# Patient Record
(demographics unavailable — no encounter records)

---

## 2024-10-28 NOTE — PLAN
[FreeTextEntry1] : COLONOSCOPY - UPCOMING APPOINTMENT SLOW TAPER LEXAPRO TO 10 MG DAILY AND FOLLOW-UP

## 2024-10-28 NOTE — HISTORY OF PRESENT ILLNESS
[de-identified] : STILL ON LIPTIOR EATING HEALTHY.   WALKING 3 MILES A DAY.   ON LEXAPRO FOR ANXIETY.  ON FOR 3 - 4 YEARS.  NO PANIC ATTACKS.  NO DEPRESSED.

## 2024-11-22 NOTE — ASSESSMENT
[FreeTextEntry1] : A/P Screening colon  I discussed the risks and benefits of colonoscopy and patient was given opportunity to ask questions. Colonoscopy to r/o colon cancer, polyps, AVM's. Patient is medically optimized for the procedure

## 2024-11-22 NOTE — HISTORY OF PRESENT ILLNESS
[FreeTextEntry1] : Patient with a history of high cholesterol, anxiety.  Here for screening  Patient has no constipation diarrhea black stools bloody stools or abdominal pain.  No unintentional weight loss.  No GERD symptoms.  No family history of colon cancer colon polyps.  He has never had a colonoscopy before

## 2024-11-23 NOTE — HISTORY OF PRESENT ILLNESS
[FreeTextEntry1] : f/u lab work [de-identified] : MR. ZAFAR IS A PLEASANT 50 YO PRESENTING FOR FOLLOW-UP TO REVIEW LAB WORK.  IS NOT TAKING VITAMIN D SUPPLEMENTS.  IS ON LIPITOR 10 MG FOR AWHILE.  MISSED ABOUT ONE WEEK OF MEDICATION.  DIET IS ALRIGHT "NOT GREAT".  PORTIONS COULD IMPROVE.  EATING OUT "QUIT A BIT".  COULD MAKE BETTER CHOICES.  WALKING IN THE MORNING.  COULD CUT DOWN ON CARBOHYDRATES.  HAS NOT SEEN A NUTRITIONIST.  WAS MORE ANXIOUS ON LEXAPRO 10 MG DAILY WHEN TAPERED.  WOULD LIKE TO GO BACK TO 20 MG.  FELT BETTER AND MORE "LEVEL" ON THAT DOSAGE.  OTHERWISE FEELING WELL TODAY.

## 2024-11-23 NOTE — PLAN
[FreeTextEntry1] : LAB WORK REVIEWED FROM 11/4/24: VITAMIN D 24.0, A1C 5.9, CHOLESTEROL 257, , HDL 39, TRIG 362 ADVISED TO START VITAMIN D 2000 DAILY WILL INCREASE LIPITOR TO 20 MG QHS RECHECK LAB WORK POST INCREASED COUNSELLED ON HEALTHY DIET AND LIFESTYLE MODIFICATIONS HEALTHY WEIGHT LOSS  NUTRITIONIST REFERRAL TO KEEP FOOD JOURNAL PRIOR WILL GO BACK TO LEXAPRO 20 MG DAILY STRESS REDUCTION EXERCISES CALL WITH ANY QUESTIONS, CONCERNS OR CHANGES

## 2024-11-23 NOTE — HISTORY OF PRESENT ILLNESS
[FreeTextEntry1] : f/u lab work [de-identified] : MR. ZAFAR IS A PLEASANT 48 YO PRESENTING FOR FOLLOW-UP TO REVIEW LAB WORK.  IS NOT TAKING VITAMIN D SUPPLEMENTS.  IS ON LIPITOR 10 MG FOR AWHILE.  MISSED ABOUT ONE WEEK OF MEDICATION.  DIET IS ALRIGHT "NOT GREAT".  PORTIONS COULD IMPROVE.  EATING OUT "QUIT A BIT".  COULD MAKE BETTER CHOICES.  WALKING IN THE MORNING.  COULD CUT DOWN ON CARBOHYDRATES.  HAS NOT SEEN A NUTRITIONIST.  WAS MORE ANXIOUS ON LEXAPRO 10 MG DAILY WHEN TAPERED.  WOULD LIKE TO GO BACK TO 20 MG.  FELT BETTER AND MORE "LEVEL" ON THAT DOSAGE.  OTHERWISE FEELING WELL TODAY.

## 2025-03-15 NOTE — HEALTH RISK ASSESSMENT
[Good] : ~his/her~  mood as  good [Yes] : Yes [Monthly or less (1 pt)] : Monthly or less (1 point) [1 or 2 (0 pts)] : 1 or 2 (0 points) [Never (0 pts)] : Never (0 points) [No] : In the past 12 months have you used drugs other than those required for medical reasons? No [Little interest or pleasure doing things] : 1) Little interest or pleasure doing things [Feeling down, depressed, or hopeless] : 2) Feeling down, depressed, or hopeless [0] : 2) Feeling down, depressed, or hopeless: Not at all (0) [PHQ-2 Negative - No further assessment needed] : PHQ-2 Negative - No further assessment needed [Former] : Former [5-9] : 5-9 [> 15 Years] : > 15 Years [NO] : No [HIV test declined] : HIV test declined [Hepatitis C test declined] : Hepatitis C test declined [None] : None [With Family] : lives with family [# of Members in Household ___] :  household currently consist of [unfilled] member(s) [Employed] : employed [College] : College [] :  [# Of Children ___] : has [unfilled] children [Sexually Active] : sexually active [Feels Safe at Home] : Feels safe at home [Reports normal functional visual acuity (ie: able to read med bottle)] : Reports normal functional visual acuity [Smoke Detector] : smoke detector [Carbon Monoxide Detector] : carbon monoxide detector [Safety elements used in home] : safety elements used in home [Seat Belt] :  uses seat belt [Sunscreen] : uses sunscreen [With Patient/Caregiver] : , with patient/caregiver [Audit-CScore] : 1 [de-identified] : walking unless bad weather [de-identified] : eating "better" [REH2Ociyl] : 0 [Change in mental status noted] : No change in mental status noted [High Risk Behavior] : no high risk behavior [Reports changes in hearing] : Reports no changes in hearing [Reports changes in vision] : Reports no changes in vision [Reports changes in dental health] : Reports no changes in dental health [Travel to Developing Areas] : does not  travel to developing areas [TB Exposure] : is not being exposed to tuberculosis [Caregiver Concerns] : does not have caregiver concerns [ColonoscopyComments] : upcoming colonoscopy april [de-identified] : GLASSES FOR DISTANCE AND READING [AdvancecareDate] : 03/25

## 2025-03-15 NOTE — PHYSICAL EXAM
[No Acute Distress] : no acute distress [Well Nourished] : well nourished [Well-Appearing] : well-appearing [Normal Sclera/Conjunctiva] : normal sclera/conjunctiva [PERRL] : pupils equal round and reactive to light [Normal Outer Ear/Nose] : the outer ears and nose were normal in appearance [Normal Oropharynx] : the oropharynx was normal [No Lymphadenopathy] : no lymphadenopathy [Supple] : supple [No Respiratory Distress] : no respiratory distress  [No Accessory Muscle Use] : no accessory muscle use [Clear to Auscultation] : lungs were clear to auscultation bilaterally [Normal Rate] : normal rate  [Regular Rhythm] : with a regular rhythm [Normal S1, S2] : normal S1 and S2 [Soft] : abdomen soft [Non Tender] : non-tender [Normal Bowel Sounds] : normal bowel sounds [No Joint Swelling] : no joint swelling [Grossly Normal Strength/Tone] : grossly normal strength/tone [No Rash] : no rash [Coordination Grossly Intact] : coordination grossly intact [No Focal Deficits] : no focal deficits [Deep Tendon Reflexes (DTR)] : deep tendon reflexes were 2+ and symmetric [Normal Gait] : normal gait [Speech Grossly Normal] : speech grossly normal [Memory Grossly Normal] : memory grossly normal [Normal Mood] : the mood was normal

## 2025-03-15 NOTE — HISTORY OF PRESENT ILLNESS
[FreeTextEntry1] : PHYSICAL PRESCRIPTIONS [de-identified] : MR. ZAFAR IS A PLEASANT 48 YO PRESENTING FOR YEARLY PHYSICAL.  IS TO HAVE AN UPCOMING COLONOSCOPY IN APRIL.  IS ALSO HERE FOR CHRONIC CARE AND PRESCRIPTION RENEWAL.  HISTORY OF ANXIETY, HYPERLIPIDEMIA, OBESITY, PREDIABETES AND VITAMIN D DEFICIENCY.  HAS HAD SOME STRESS WITH WORK.  ON LEXAPRO 20 MG DAILY. NO SIDE EFFECTS FROM MEDICATION.  DENIES PANIC ATTACKS.  IS NOT FEELING DEPRESSED.  PLAYS WITH DOG, READS FOR STRESS RELIEF AND USES 3D PRINTER.  HAS HAD NO SUICIDAL/HOMICIDAL IDEATIONS OR PLANS.  DOES NOTE THAT HE GETS DISTRACTED EASILY WHEN READING MORE THAN YEARS AGO BUT HAS HAD IN THE PAST.  SOMETIMES TROUBLE CONCENTRATING. HAS ALWAY SBEEN A STRUGGLE EVEN WHEN IN HIGH SCHOOL.  WONDERS IF HE HAS UNDIAGNOSED ADD.  REMAINS ON LIPITOR FOR CHOLESTEROL AND IS TAKING VITAMIN D SUPPLEMENTS.

## 2025-03-15 NOTE — HEALTH RISK ASSESSMENT
[Good] : ~his/her~  mood as  good [Yes] : Yes [Monthly or less (1 pt)] : Monthly or less (1 point) [1 or 2 (0 pts)] : 1 or 2 (0 points) [Never (0 pts)] : Never (0 points) [No] : In the past 12 months have you used drugs other than those required for medical reasons? No [Little interest or pleasure doing things] : 1) Little interest or pleasure doing things [Feeling down, depressed, or hopeless] : 2) Feeling down, depressed, or hopeless [0] : 2) Feeling down, depressed, or hopeless: Not at all (0) [PHQ-2 Negative - No further assessment needed] : PHQ-2 Negative - No further assessment needed [Former] : Former [5-9] : 5-9 [> 15 Years] : > 15 Years [NO] : No [HIV test declined] : HIV test declined [Hepatitis C test declined] : Hepatitis C test declined [None] : None [With Family] : lives with family [# of Members in Household ___] :  household currently consist of [unfilled] member(s) [Employed] : employed [College] : College [] :  [# Of Children ___] : has [unfilled] children [Sexually Active] : sexually active [Feels Safe at Home] : Feels safe at home [Reports normal functional visual acuity (ie: able to read med bottle)] : Reports normal functional visual acuity [Smoke Detector] : smoke detector [Carbon Monoxide Detector] : carbon monoxide detector [Safety elements used in home] : safety elements used in home [Seat Belt] :  uses seat belt [Sunscreen] : uses sunscreen [With Patient/Caregiver] : , with patient/caregiver [de-identified] : walking unless bad weather [Audit-CScore] : 1 [de-identified] : eating "better" [ILP1Hcdcb] : 0 [Change in mental status noted] : No change in mental status noted [High Risk Behavior] : no high risk behavior [Reports changes in hearing] : Reports no changes in hearing [Reports changes in vision] : Reports no changes in vision [Reports changes in dental health] : Reports no changes in dental health [Travel to Developing Areas] : does not  travel to developing areas [TB Exposure] : is not being exposed to tuberculosis [Caregiver Concerns] : does not have caregiver concerns [ColonoscopyComments] : upcoming colonoscopy april [de-identified] : GLASSES FOR DISTANCE AND READING [AdvancecareDate] : 03/25

## 2025-03-15 NOTE — HISTORY OF PRESENT ILLNESS
[FreeTextEntry1] : PHYSICAL PRESCRIPTIONS [de-identified] : MR. ZAFAR IS A PLEASANT 50 YO PRESENTING FOR YEARLY PHYSICAL.  IS TO HAVE AN UPCOMING COLONOSCOPY IN APRIL.  IS ALSO HERE FOR CHRONIC CARE AND PRESCRIPTION RENEWAL.  HISTORY OF ANXIETY, HYPERLIPIDEMIA, OBESITY, PREDIABETES AND VITAMIN D DEFICIENCY.  HAS HAD SOME STRESS WITH WORK.  ON LEXAPRO 20 MG DAILY. NO SIDE EFFECTS FROM MEDICATION.  DENIES PANIC ATTACKS.  IS NOT FEELING DEPRESSED.  PLAYS WITH DOG, READS FOR STRESS RELIEF AND USES 3D PRINTER.  HAS HAD NO SUICIDAL/HOMICIDAL IDEATIONS OR PLANS.  DOES NOTE THAT HE GETS DISTRACTED EASILY WHEN READING MORE THAN YEARS AGO BUT HAS HAD IN THE PAST.  SOMETIMES TROUBLE CONCENTRATING. HAS ALWAY SBEEN A STRUGGLE EVEN WHEN IN HIGH SCHOOL.  WONDERS IF HE HAS UNDIAGNOSED ADD.  REMAINS ON LIPITOR FOR CHOLESTEROL AND IS TAKING VITAMIN D SUPPLEMENTS.

## 2025-03-15 NOTE — PLAN
[FreeTextEntry1] : HAVING UPCOMING COLONOSCOPY VISION SCREENING SAFETY / HEALTHY HABITS REVIEWED HEALTHY DIET AND LIFESTYLE MODIFICATIONS VACCINATIONS DISCUSSED: COVID, FLU, TDAP.  [facility] AGE 50 CHECK ROUTINE LAB WORK  EKG: NSR AT 61 BPM, NO ACUTE ST-T WAVE CHANGES FOLLOW-UP ALL SPECIALISTS AS DIRECTED; CARDIOLOGY - DID NOT COMPLETE TESTING WILL GET UPDATED LIPIDS, A1C AND VITAMIN D LEVELS HEALTHY WEIGHT LOSS CONTINUE LIPITOR 20 MG QHS FOR NOW TAKING VITAMIN D SUPPLEMENTS FEELING WELL ON LEXAPRO 20 MG DAILY - WILL CONTINUE STRESS REDUCTION EXERCISES DSICUSSED EVALUATION FOR ADD, NEUROPSYCHOLOGIST EVALUATION / NEUROLOGY SUPPORT GIVEN CALL WITH ANY QUESTIONS, CONCERNS OR CHANGES